# Patient Record
Sex: FEMALE | Race: WHITE | URBAN - METROPOLITAN AREA
[De-identification: names, ages, dates, MRNs, and addresses within clinical notes are randomized per-mention and may not be internally consistent; named-entity substitution may affect disease eponyms.]

---

## 2017-08-27 ENCOUNTER — EMERGENCY (EMERGENCY)
Facility: HOSPITAL | Age: 82
LOS: 0 days | Discharge: HOME | End: 2017-08-28
Attending: EMERGENCY MEDICINE
Payer: OTHER MISCELLANEOUS

## 2017-08-27 VITALS
TEMPERATURE: 98 F | DIASTOLIC BLOOD PRESSURE: 64 MMHG | SYSTOLIC BLOOD PRESSURE: 120 MMHG | WEIGHT: 89.95 LBS | HEART RATE: 82 BPM | RESPIRATION RATE: 18 BRPM | HEIGHT: 62 IN

## 2017-08-27 DIAGNOSIS — I21.3 ST ELEVATION (STEMI) MYOCARDIAL INFARCTION OF UNSPECIFIED SITE: ICD-10-CM

## 2017-08-27 DIAGNOSIS — Z79.82 LONG TERM (CURRENT) USE OF ASPIRIN: ICD-10-CM
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
## 2017-08-27 DIAGNOSIS — E11.9 TYPE 2 DIABETES MELLITUS WITHOUT COMPLICATIONS: ICD-10-CM
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
## 2017-08-27 DIAGNOSIS — E78.00 PURE HYPERCHOLESTEROLEMIA, UNSPECIFIED: ICD-10-CM

## 2017-08-27 DIAGNOSIS — I25.2 OLD MYOCARDIAL INFARCTION: ICD-10-CM

## 2017-08-27 DIAGNOSIS — D64.9 ANEMIA, UNSPECIFIED: ICD-10-CM

## 2017-08-27 DIAGNOSIS — T14.8 OTHER INJURY OF UNSPECIFIED BODY REGION: ICD-10-CM

## 2017-08-27 DIAGNOSIS — M25.552 PAIN IN LEFT HIP: ICD-10-CM

## 2017-08-27 DIAGNOSIS — Z95.5 PRESENCE OF CORONARY ANGIOPLASTY IMPLANT AND GRAFT: ICD-10-CM

## 2017-08-27 DIAGNOSIS — Z91.81 HISTORY OF FALLING: ICD-10-CM

## 2017-08-27 DIAGNOSIS — Z86.73 PERSONAL HISTORY OF TRANSIENT ISCHEMIC ATTACK (TIA), AND CEREBRAL INFARCTION WITHOUT RESIDUAL DEFICITS: ICD-10-CM

## 2017-08-27 DIAGNOSIS — Z87.891 PERSONAL HISTORY OF NICOTINE DEPENDENCE: ICD-10-CM

## 2017-08-27 LAB
ALBUMIN SERPL ELPH-MCNC: 3 G/DL — LOW (ref 3.3–5)
ALP SERPL-CCNC: 76 U/L — SIGNIFICANT CHANGE UP (ref 40–120)
ALT FLD-CCNC: 9 U/L — LOW (ref 12–78)
ANION GAP SERPL CALC-SCNC: 8 MMOL/L — SIGNIFICANT CHANGE UP (ref 5–17)
APPEARANCE UR: ABNORMAL
APTT BLD: 33 SEC — SIGNIFICANT CHANGE UP (ref 27.5–37.4)
AST SERPL-CCNC: 16 U/L — SIGNIFICANT CHANGE UP (ref 15–37)
BILIRUB SERPL-MCNC: 0.2 MG/DL — SIGNIFICANT CHANGE UP (ref 0.2–1.2)
BILIRUB UR-MCNC: NEGATIVE — SIGNIFICANT CHANGE UP
BUN SERPL-MCNC: 20 MG/DL — SIGNIFICANT CHANGE UP (ref 7–23)
CALCIUM SERPL-MCNC: 8.5 MG/DL — SIGNIFICANT CHANGE UP (ref 8.5–10.1)
CHLORIDE SERPL-SCNC: 107 MMOL/L — SIGNIFICANT CHANGE UP (ref 96–108)
CK MB CFR SERPL CALC: 1.1 NG/ML — SIGNIFICANT CHANGE UP (ref 0.5–3.6)
CO2 SERPL-SCNC: 27 MMOL/L — SIGNIFICANT CHANGE UP (ref 22–31)
COLOR SPEC: YELLOW — SIGNIFICANT CHANGE UP
CREAT SERPL-MCNC: 0.98 MG/DL — SIGNIFICANT CHANGE UP (ref 0.5–1.3)
DIFF PNL FLD: ABNORMAL
EOSINOPHIL NFR BLD AUTO: 1 % — SIGNIFICANT CHANGE UP (ref 0–6)
GLUCOSE SERPL-MCNC: 128 MG/DL — HIGH (ref 70–99)
GLUCOSE UR QL: NEGATIVE MG/DL — SIGNIFICANT CHANGE UP
HCT VFR BLD CALC: 28.3 % — LOW (ref 34.5–45)
HGB BLD-MCNC: 8.5 G/DL — LOW (ref 11.5–15.5)
INR BLD: 1.05 RATIO — SIGNIFICANT CHANGE UP (ref 0.88–1.16)
KETONES UR-MCNC: NEGATIVE — SIGNIFICANT CHANGE UP
LEUKOCYTE ESTERASE UR-ACNC: ABNORMAL
LYMPHOCYTES # BLD AUTO: 31 % — SIGNIFICANT CHANGE UP (ref 13–44)
MCHC RBC-ENTMCNC: 24.1 PG — LOW (ref 27–34)
MCHC RBC-ENTMCNC: 30 GM/DL — LOW (ref 32–36)
MCV RBC AUTO: 80.3 FL — SIGNIFICANT CHANGE UP (ref 80–100)
MONOCYTES NFR BLD AUTO: 3 % — SIGNIFICANT CHANGE UP (ref 2–14)
NEUTROPHILS NFR BLD AUTO: 65 % — SIGNIFICANT CHANGE UP (ref 43–77)
NITRITE UR-MCNC: POSITIVE
PH UR: 5 — SIGNIFICANT CHANGE UP (ref 5–8)
PLATELET # BLD AUTO: 182 K/UL — SIGNIFICANT CHANGE UP (ref 150–400)
POTASSIUM SERPL-MCNC: 3.8 MMOL/L — SIGNIFICANT CHANGE UP (ref 3.5–5.3)
POTASSIUM SERPL-SCNC: 3.8 MMOL/L — SIGNIFICANT CHANGE UP (ref 3.5–5.3)
PROT SERPL-MCNC: 7.3 GM/DL — SIGNIFICANT CHANGE UP (ref 6–8.3)
PROT UR-MCNC: 15 MG/DL
PROTHROM AB SERPL-ACNC: 11.5 SEC — SIGNIFICANT CHANGE UP (ref 9.8–12.7)
RBC # BLD: 3.53 M/UL — LOW (ref 3.8–5.2)
RBC # FLD: 15.6 % — HIGH (ref 11–15)
SODIUM SERPL-SCNC: 142 MMOL/L — SIGNIFICANT CHANGE UP (ref 135–145)
SP GR SPEC: 1.01 — SIGNIFICANT CHANGE UP (ref 1.01–1.02)
TROPONIN I SERPL-MCNC: 0.02 NG/ML — SIGNIFICANT CHANGE UP (ref 0.01–0.04)
UROBILINOGEN FLD QL: NEGATIVE MG/DL — SIGNIFICANT CHANGE UP
WBC # BLD: 6 K/UL — SIGNIFICANT CHANGE UP (ref 3.8–10.5)
WBC # FLD AUTO: 6 K/UL — SIGNIFICANT CHANGE UP (ref 3.8–10.5)

## 2017-08-27 PROCEDURE — 76377 3D RENDER W/INTRP POSTPROCES: CPT | Mod: 26

## 2017-08-27 PROCEDURE — 99285 EMERGENCY DEPT VISIT HI MDM: CPT

## 2017-08-27 PROCEDURE — 70450 CT HEAD/BRAIN W/O DYE: CPT | Mod: 26

## 2017-08-27 PROCEDURE — 72125 CT NECK SPINE W/O DYE: CPT | Mod: 26

## 2017-08-27 PROCEDURE — 73502 X-RAY EXAM HIP UNI 2-3 VIEWS: CPT | Mod: 26,LT

## 2017-08-27 PROCEDURE — 71010: CPT | Mod: 26

## 2017-08-27 NOTE — ED PROVIDER NOTE - PROGRESS NOTE DETAILS
Dr duran will follow up labs, ct, and xray Pt is able to fully extend legs to 90 degrees. Is bed bound at baseline due to frequent falls. UTI found on ua. Treated with ceftriaxone IV. [facility], they recommended patient ok for discharge home and will send a provider tomorrow to check on her. Family aware and in agreement with plan.

## 2017-08-27 NOTE — ED PROVIDER NOTE - NORMAL STATEMENT, MLM
Airway patent, nasal mucosa clear, mouth with normal mucosa. Throat has no vesicles, no oropharyngeal exudates and uvula is midline. Clear tympanic membranes b/l   No sign of head trauma

## 2017-08-28 VITALS
RESPIRATION RATE: 18 BRPM | OXYGEN SATURATION: 98 % | HEART RATE: 78 BPM | DIASTOLIC BLOOD PRESSURE: 84 MMHG | SYSTOLIC BLOOD PRESSURE: 179 MMHG

## 2017-08-28 RX ORDER — CEPHALEXIN 500 MG
1 CAPSULE ORAL
Qty: 21 | Refills: 0 | OUTPATIENT
Start: 2017-08-28 | End: 2017-09-04

## 2017-08-28 RX ORDER — CEFTRIAXONE 500 MG/1
1 INJECTION, POWDER, FOR SOLUTION INTRAMUSCULAR; INTRAVENOUS ONCE
Qty: 0 | Refills: 0 | Status: COMPLETED | OUTPATIENT
Start: 2017-08-28 | End: 2017-08-28

## 2017-08-28 RX ADMIN — CEFTRIAXONE 100 GRAM(S): 500 INJECTION, POWDER, FOR SOLUTION INTRAMUSCULAR; INTRAVENOUS at 00:58

## 2017-08-28 NOTE — ED ADULT NURSE NOTE - PMH
Anemia, unspecified type    Blockage of coronary artery of heart    Diabetes    Dizziness    Falls  h/o  High cholesterol    HTN (hypertension)    MI (myocardial infarction)    Stented coronary artery  8 stents  Syncope, vasovagal    TIA (transient ischemic attack)    Tobacco abuse  quit years ago  Vasovagal syncope

## 2017-11-17 ENCOUNTER — APPOINTMENT (OUTPATIENT)
Dept: HOME HEALTH SERVICES | Facility: HOME HEALTH | Age: 82
End: 2017-11-17
Payer: MEDICARE

## 2017-11-17 VITALS
HEART RATE: 68 BPM | SYSTOLIC BLOOD PRESSURE: 122 MMHG | OXYGEN SATURATION: 94 % | TEMPERATURE: 99.4 F | RESPIRATION RATE: 16 BRPM | DIASTOLIC BLOOD PRESSURE: 64 MMHG

## 2017-11-17 VITALS — RESPIRATION RATE: 14 BRPM | OXYGEN SATURATION: 95 % | HEART RATE: 70 BPM

## 2017-11-17 VITALS — WEIGHT: 85 LBS

## 2017-11-17 DIAGNOSIS — Z83.49 FAMILY HISTORY OF OTHER ENDOCRINE, NUTRITIONAL AND METABOLIC DISEASES: ICD-10-CM

## 2017-11-17 DIAGNOSIS — Z74.01 BED CONFINEMENT STATUS: ICD-10-CM

## 2017-11-17 DIAGNOSIS — Z82.5 FAMILY HISTORY OF ASTHMA AND OTHER CHRONIC LOWER RESPIRATORY DISEASES: ICD-10-CM

## 2017-11-17 DIAGNOSIS — R13.10 DYSPHAGIA, UNSPECIFIED: ICD-10-CM

## 2017-11-17 DIAGNOSIS — Z86.73 PERSONAL HISTORY OF TRANSIENT ISCHEMIC ATTACK (TIA), AND CEREBRAL INFARCTION W/OUT RESIDUAL DEFICITS: ICD-10-CM

## 2017-11-17 DIAGNOSIS — Z63.4 DISAPPEARANCE AND DEATH OF FAMILY MEMBER: ICD-10-CM

## 2017-11-17 DIAGNOSIS — R63.4 ABNORMAL WEIGHT LOSS: ICD-10-CM

## 2017-11-17 PROCEDURE — G0506: CPT

## 2017-11-17 PROCEDURE — 99345 HOME/RES VST NEW HIGH MDM 75: CPT | Mod: 25

## 2017-11-17 PROCEDURE — G0438: CPT

## 2017-11-17 SDOH — SOCIAL STABILITY - SOCIAL INSECURITY: DISSAPEARANCE AND DEATH OF FAMILY MEMBER: Z63.4

## 2017-11-21 ENCOUNTER — LABORATORY RESULT (OUTPATIENT)
Age: 82
End: 2017-11-21

## 2017-11-24 ENCOUNTER — APPOINTMENT (OUTPATIENT)
Dept: HOME HEALTH SERVICES | Facility: HOME HEALTH | Age: 82
End: 2017-11-24

## 2017-11-27 LAB
BASOPHILS # BLD AUTO: 0.02 K/UL
BASOPHILS NFR BLD AUTO: 0.3 %
EOSINOPHIL # BLD AUTO: 0.08 K/UL
EOSINOPHIL NFR BLD AUTO: 1.3
HBA1C MFR BLD HPLC: 6.2 %
HCT VFR BLD CALC: 29.3 %
HGB BLD-MCNC: 9.1 G/DL
IMM GRANULOCYTES NFR BLD AUTO: 0.3 %
LYMPHOCYTES # BLD AUTO: 1.84 K/UL
LYMPHOCYTES NFR BLD AUTO: 30.6 %
MAN DIFF?: NORMAL
MCHC RBC-ENTMCNC: 24.5 PG
MCHC RBC-ENTMCNC: 31.1 GM/DL
MCV RBC AUTO: 79 FL
MONOCYTES # BLD AUTO: 0.42 K/UL
MONOCYTES NFR BLD AUTO: 7 %
NEUTROPHILS # BLD AUTO: 3.64 K/UL
NEUTROPHILS NFR BLD AUTO: 60.5 %
PLATELET # BLD AUTO: 196 K/UL
PREALB SERPL NEPH-MCNC: 16 MG/DL
RBC # BLD: 3.71 M/UL
RBC # FLD: 15.6 %
TSH SERPL-ACNC: 2.07 UIU/ML
WBC # FLD AUTO: 6.02 K/UL

## 2017-11-29 ENCOUNTER — CHART COPY (OUTPATIENT)
Age: 82
End: 2017-11-29

## 2017-12-01 ENCOUNTER — APPOINTMENT (OUTPATIENT)
Dept: HOME HEALTH SERVICES | Facility: HOME HEALTH | Age: 82
End: 2017-12-01

## 2017-12-03 ENCOUNTER — CLINICAL ADVICE (OUTPATIENT)
Age: 82
End: 2017-12-03

## 2017-12-04 ENCOUNTER — MEDICATION RENEWAL (OUTPATIENT)
Age: 82
End: 2017-12-04

## 2017-12-08 ENCOUNTER — CLINICAL ADVICE (OUTPATIENT)
Age: 82
End: 2017-12-08

## 2017-12-12 ENCOUNTER — APPOINTMENT (OUTPATIENT)
Dept: HOME HEALTH SERVICES | Facility: HOME HEALTH | Age: 82
End: 2017-12-12

## 2017-12-12 VITALS
RESPIRATION RATE: 16 BRPM | TEMPERATURE: 97.6 F | HEART RATE: 74 BPM | DIASTOLIC BLOOD PRESSURE: 62 MMHG | OXYGEN SATURATION: 92 % | SYSTOLIC BLOOD PRESSURE: 116 MMHG

## 2017-12-15 PROBLEM — Z74.01 CONFINED TO BED: Status: ACTIVE | Noted: 2017-11-17

## 2017-12-19 ENCOUNTER — MEDICATION RENEWAL (OUTPATIENT)
Age: 82
End: 2017-12-19

## 2017-12-22 ENCOUNTER — APPOINTMENT (OUTPATIENT)
Dept: HOME HEALTH SERVICES | Facility: HOME HEALTH | Age: 82
End: 2017-12-22
Payer: MEDICARE

## 2017-12-22 VITALS
TEMPERATURE: 98 F | SYSTOLIC BLOOD PRESSURE: 132 MMHG | RESPIRATION RATE: 12 BRPM | HEART RATE: 71 BPM | OXYGEN SATURATION: 98 % | DIASTOLIC BLOOD PRESSURE: 70 MMHG

## 2017-12-22 DIAGNOSIS — S22.32XA FRACTURE OF ONE RIB, LEFT SIDE, INITIAL ENCOUNTER FOR CLOSED FRACTURE: ICD-10-CM

## 2017-12-22 DIAGNOSIS — Z65.8 OTHER SPECIFIED PROBLEMS RELATED TO PSYCHOSOCIAL CIRCUMSTANCES: ICD-10-CM

## 2017-12-22 DIAGNOSIS — I10 ESSENTIAL (PRIMARY) HYPERTENSION: ICD-10-CM

## 2017-12-22 DIAGNOSIS — T17.908A UNSPECIFIED FOREIGN BODY IN RESPIRATORY TRACT, PART UNSPECIFIED CAUSING OTHER INJURY, INITIAL ENCOUNTER: ICD-10-CM

## 2017-12-22 PROCEDURE — 99349 HOME/RES VST EST MOD MDM 40: CPT

## 2017-12-26 ENCOUNTER — OTHER (OUTPATIENT)
Age: 82
End: 2017-12-26

## 2018-01-16 ENCOUNTER — MEDICATION RENEWAL (OUTPATIENT)
Age: 83
End: 2018-01-16

## 2018-01-22 ENCOUNTER — CLINICAL ADVICE (OUTPATIENT)
Age: 83
End: 2018-01-22

## 2018-01-30 ENCOUNTER — CLINICAL ADVICE (OUTPATIENT)
Age: 83
End: 2018-01-30

## 2018-01-30 DIAGNOSIS — R41.82 ALTERED MENTAL STATUS, UNSPECIFIED: ICD-10-CM

## 2018-02-01 ENCOUNTER — APPOINTMENT (OUTPATIENT)
Dept: HOME HEALTH SERVICES | Facility: HOME HEALTH | Age: 83
End: 2018-02-01

## 2018-02-01 VITALS
TEMPERATURE: 97.8 F | SYSTOLIC BLOOD PRESSURE: 126 MMHG | OXYGEN SATURATION: 98 % | HEART RATE: 72 BPM | DIASTOLIC BLOOD PRESSURE: 68 MMHG | RESPIRATION RATE: 16 BRPM

## 2018-02-06 ENCOUNTER — CLINICAL ADVICE (OUTPATIENT)
Age: 83
End: 2018-02-06

## 2018-02-06 LAB
ALBUMIN SERPL ELPH-MCNC: 3.4 G/DL
ALP BLD-CCNC: 64 U/L
ALT SERPL-CCNC: 6 U/L
ANION GAP SERPL CALC-SCNC: 13 MMOL/L
AST SERPL-CCNC: 9 U/L
BASOPHILS # BLD AUTO: 0.03 K/UL
BASOPHILS NFR BLD AUTO: 0.6 %
BILIRUB SERPL-MCNC: 0.2 MG/DL
BUN SERPL-MCNC: 26 MG/DL
CALCIUM SERPL-MCNC: 8.9 MG/DL
CHLORIDE SERPL-SCNC: 102 MMOL/L
CO2 SERPL-SCNC: 25 MMOL/L
CREAT SERPL-MCNC: 1.12 MG/DL
EOSINOPHIL # BLD AUTO: 0.05 K/UL
EOSINOPHIL NFR BLD AUTO: 1 %
HCT VFR BLD CALC: 25.8 %
HGB BLD-MCNC: 8.1 G/DL
IMM GRANULOCYTES NFR BLD AUTO: 0.4 %
LYMPHOCYTES # BLD AUTO: 1.18 K/UL
LYMPHOCYTES NFR BLD AUTO: 23.7 %
MAN DIFF?: NORMAL
MCHC RBC-ENTMCNC: 24.7 PG
MCHC RBC-ENTMCNC: 31.4 GM/DL
MCV RBC AUTO: 78.7 FL
MONOCYTES # BLD AUTO: 0.31 K/UL
MONOCYTES NFR BLD AUTO: 6.2 %
NEUTROPHILS # BLD AUTO: 3.39 K/UL
NEUTROPHILS NFR BLD AUTO: 68.1 %
PLATELET # BLD AUTO: 167 K/UL
POTASSIUM SERPL-SCNC: 3.8 MMOL/L
PROT SERPL-MCNC: 6.5 G/DL
RBC # BLD: 3.28 M/UL
RBC # FLD: 16.3 %
SODIUM SERPL-SCNC: 140 MMOL/L
WBC # FLD AUTO: 4.98 K/UL

## 2018-02-13 ENCOUNTER — CLINICAL ADVICE (OUTPATIENT)
Age: 83
End: 2018-02-13

## 2018-02-14 ENCOUNTER — APPOINTMENT (OUTPATIENT)
Dept: HOME HEALTH SERVICES | Facility: HOME HEALTH | Age: 83
End: 2018-02-14

## 2018-02-14 VITALS
SYSTOLIC BLOOD PRESSURE: 146 MMHG | DIASTOLIC BLOOD PRESSURE: 82 MMHG | OXYGEN SATURATION: 98 % | TEMPERATURE: 98.4 F | HEART RATE: 80 BPM | RESPIRATION RATE: 16 BRPM

## 2018-02-14 RX ORDER — NITROGLYCERIN 0.4 MG/1
0.4 TABLET SUBLINGUAL
Qty: 1 | Refills: 1 | Status: ACTIVE | COMMUNITY
Start: 2017-11-17

## 2018-02-16 ENCOUNTER — APPOINTMENT (OUTPATIENT)
Dept: HOME HEALTH SERVICES | Facility: HOME HEALTH | Age: 83
End: 2018-02-16
Payer: MEDICARE

## 2018-02-16 VITALS
OXYGEN SATURATION: 99 % | TEMPERATURE: 98.3 F | DIASTOLIC BLOOD PRESSURE: 60 MMHG | SYSTOLIC BLOOD PRESSURE: 178 MMHG | HEART RATE: 71 BPM | RESPIRATION RATE: 14 BRPM

## 2018-02-16 DIAGNOSIS — K56.41 FECAL IMPACTION: ICD-10-CM

## 2018-02-16 DIAGNOSIS — E11.49 TYPE 2 DIABETES MELLITUS WITH OTHER DIABETIC NEUROLOGICAL COMPLICATION: ICD-10-CM

## 2018-02-16 PROCEDURE — 99350 HOME/RES VST EST HIGH MDM 60: CPT

## 2018-02-28 ENCOUNTER — MEDICATION RENEWAL (OUTPATIENT)
Age: 83
End: 2018-02-28

## 2018-03-06 ENCOUNTER — LABORATORY RESULT (OUTPATIENT)
Age: 83
End: 2018-03-06

## 2018-03-07 LAB
ALBUMIN SERPL ELPH-MCNC: 3.7 G/DL
ALP BLD-CCNC: 67 U/L
ALT SERPL-CCNC: 9 U/L
ANION GAP SERPL CALC-SCNC: 11 MMOL/L
AST SERPL-CCNC: 11 U/L
BASOPHILS # BLD AUTO: 0.02 K/UL
BASOPHILS NFR BLD AUTO: 0.4 %
BILIRUB SERPL-MCNC: 0.2 MG/DL
BUN SERPL-MCNC: 22 MG/DL
CALCIUM SERPL-MCNC: 8.9 MG/DL
CHLORIDE SERPL-SCNC: 103 MMOL/L
CO2 SERPL-SCNC: 25 MMOL/L
CREAT SERPL-MCNC: 1.12 MG/DL
EOSINOPHIL # BLD AUTO: 0.05 K/UL
EOSINOPHIL NFR BLD AUTO: 1.1 %
FERRITIN SERPL-MCNC: 16 NG/ML
HCT VFR BLD CALC: 27.2 %
HGB BLD-MCNC: 8.6 G/DL
IMM GRANULOCYTES NFR BLD AUTO: 0.4 %
LYMPHOCYTES # BLD AUTO: 1.47 K/UL
LYMPHOCYTES NFR BLD AUTO: 31.9 %
MAN DIFF?: NORMAL
MCHC RBC-ENTMCNC: 25.7 PG
MCHC RBC-ENTMCNC: 31.6 GM/DL
MCV RBC AUTO: 81.2 FL
MONOCYTES # BLD AUTO: 0.27 K/UL
MONOCYTES NFR BLD AUTO: 5.9 %
NEUTROPHILS # BLD AUTO: 2.78 K/UL
NEUTROPHILS NFR BLD AUTO: 60.3 %
PLATELET # BLD AUTO: 167 K/UL
POTASSIUM SERPL-SCNC: 4.8 MMOL/L
PROT SERPL-MCNC: 6.8 G/DL
RBC # BLD: 3.35 M/UL
RBC # FLD: 16.8 %
SODIUM SERPL-SCNC: 139 MMOL/L
WBC # FLD AUTO: 4.61 K/UL

## 2018-03-08 ENCOUNTER — CLINICAL ADVICE (OUTPATIENT)
Age: 83
End: 2018-03-08

## 2018-03-14 ENCOUNTER — CLINICAL ADVICE (OUTPATIENT)
Age: 83
End: 2018-03-14

## 2018-03-16 ENCOUNTER — MEDICATION RENEWAL (OUTPATIENT)
Age: 83
End: 2018-03-16

## 2018-03-16 DIAGNOSIS — Z51.5 ENCOUNTER FOR PALLIATIVE CARE: ICD-10-CM

## 2018-04-13 ENCOUNTER — CLINICAL ADVICE (OUTPATIENT)
Age: 83
End: 2018-04-13

## 2018-04-13 RX ORDER — SULFAMETHOXAZOLE AND TRIMETHOPRIM 800; 160 MG/1; MG/1
800-160 TABLET ORAL TWICE DAILY
Qty: 10 | Refills: 0 | Status: DISCONTINUED | COMMUNITY
Start: 2018-02-13 | End: 2018-04-13

## 2018-04-16 ENCOUNTER — MEDICATION RENEWAL (OUTPATIENT)
Age: 83
End: 2018-04-16

## 2018-04-30 ENCOUNTER — CLINICAL ADVICE (OUTPATIENT)
Age: 83
End: 2018-04-30

## 2018-04-30 DIAGNOSIS — R19.7 DIARRHEA, UNSPECIFIED: ICD-10-CM

## 2018-05-04 ENCOUNTER — APPOINTMENT (OUTPATIENT)
Dept: HOME HEALTH SERVICES | Facility: HOME HEALTH | Age: 83
End: 2018-05-04
Payer: MEDICARE

## 2018-05-04 VITALS
RESPIRATION RATE: 14 BRPM | DIASTOLIC BLOOD PRESSURE: 55 MMHG | TEMPERATURE: 98.3 F | OXYGEN SATURATION: 97 % | HEART RATE: 72 BPM | SYSTOLIC BLOOD PRESSURE: 130 MMHG

## 2018-05-04 DIAGNOSIS — D50.9 IRON DEFICIENCY ANEMIA, UNSPECIFIED: ICD-10-CM

## 2018-05-04 DIAGNOSIS — I25.118 ATHEROSCLEROTIC HEART DISEASE OF NATIVE CORONARY ARTERY WITH OTHER FORMS OF ANGINA PECTORIS: ICD-10-CM

## 2018-05-04 DIAGNOSIS — K59.09 OTHER CONSTIPATION: ICD-10-CM

## 2018-05-04 PROCEDURE — 99349 HOME/RES VST EST MOD MDM 40: CPT | Mod: GV

## 2018-05-04 RX ORDER — GLUCOSAMINE HCL/CHONDROITIN SU 500-400 MG
3 CAPSULE ORAL
Qty: 90 | Refills: 3 | Status: DISCONTINUED | COMMUNITY
Start: 2017-11-17 | End: 2018-05-04

## 2018-05-04 RX ORDER — SENNOSIDES 8.6 MG TABLETS 8.6 MG/1
8.6 TABLET ORAL
Qty: 30 | Refills: 5 | Status: ACTIVE | COMMUNITY
Start: 2017-11-17

## 2018-05-04 RX ORDER — POLYETHYLENE GLYCOL 3350 17 G/17G
17 POWDER, FOR SOLUTION ORAL DAILY
Qty: 1 | Refills: 0 | Status: ACTIVE | COMMUNITY
Start: 2018-02-06

## 2018-05-08 ENCOUNTER — CLINICAL ADVICE (OUTPATIENT)
Age: 83
End: 2018-05-08

## 2018-05-08 RX ORDER — TRAZODONE HYDROCHLORIDE 50 MG/1
50 TABLET ORAL
Qty: 90 | Refills: 3 | Status: DISCONTINUED | COMMUNITY
Start: 2017-12-22 | End: 2018-05-08

## 2018-05-09 ENCOUNTER — MEDICATION RENEWAL (OUTPATIENT)
Age: 83
End: 2018-05-09

## 2018-05-09 RX ORDER — MORPHINE SULFATE 100 MG/5ML
100 SOLUTION ORAL
Qty: 30 | Refills: 0 | Status: ACTIVE | COMMUNITY
Start: 2018-05-09 | End: 1900-01-01

## 2018-05-14 DIAGNOSIS — N39.0 URINARY TRACT INFECTION, SITE NOT SPECIFIED: ICD-10-CM

## 2018-05-28 NOTE — ED PROVIDER NOTE - PROGRESS NOTE
Subjective:      Patient ID: Marina Trimble is a 81 y.o. female.    Chief Complaint: Diabetes Mellitus (pcp ehrensing 4/4/18); Diabetic Foot Exam; and Nail Care    Marina is a 81 y.o. female who presents to the clinic for evaluation and treatment of high risk feet. 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 The patient's chief complaint is long, thick toenails. This patient has documented high risk feet requiring routine maintenance secondary to diabetes mellitis and those secondary complications of diabetes, as mentioned. She has the one type of anemia; pernious 281.0 that still qualifies for nail care.    PCP: Jose Jimenez MD    Date Last Seen by PCP:   Chief Complaint   Patient presents with    Diabetes Mellitus     pcp ehrensing 4/4/18    Diabetic Foot Exam    Nail Care       Current shoe gear:  SAS shoes    Hemoglobin A1C   Date Value Ref Range Status   11/09/2017 6.9 (H) 4.0 - 5.6 % Final     Comment:     According to ADA guidelines, hemoglobin A1c <7.0% represents  optimal control in non-pregnant diabetic patients. Different  metrics may apply to  specific patient populations.   Standards of Medical Care in Diabetes-2016.  For the purpose of screening for the presence of diabetes:  <5.7%     Consistent with the absence of diabetes  5.7-6.4%  Consistent with increasing risk for diabetes   (prediabetes)  >or=6.5%  Consistent with diabetes  Currently, no consensus exists for use of hemoglobin A1c  for diagnosis of diabetes for children.  This Hemoglobin A1c assay has significant interference with fetal   hemoglobin   (HbF). The results are invalid for patients with abnormal amounts of   HbF,   including those with known Hereditary Persistence   of Fetal Hemoglobin. Heterozygous hemoglobin variants (HbAS, HbAC,   HbAD, HbAE, HbA2) do not significantly interfere with this assay;   however, presence of multiple variants in a sample may impact the %   interference.     07/09/2017 8.1 (H) 4.0 - 5.6 % Final     Comment:     According to ADA guidelines, hemoglobin A1c <7.0% represents  optimal control in non-pregnant diabetic patients. Different  metrics may apply to specific patient populations.   Standards of Medical Care in Diabetes-2016.  For the purpose of screening for the presence of diabetes:  <5.7%     Consistent with the absence of diabetes  5.7-6.4%  Consistent with increasing risk for diabetes   (prediabetes)  >or=6.5%  Consistent with diabetes  Currently, no consensus exists for use of hemoglobin A1c  for diagnosis of diabetes for children.  This Hemoglobin A1c assay has significant interference with fetal   hemoglobin   (HbF). The results are invalid for patients with abnormal amounts of   HbF,   including those with known Hereditary Persistence   of Fetal Hemoglobin. Heterozygous hemoglobin variants (HbAS, HbAC,   HbAD, HbAE, HbA2) do not significantly interfere with this assay;   however, presence of multiple variants in a sample may impact the %   interference.     12/23/2016 9.2 (H) 4.5 - 6.2 % Final     Comment:     According to ADA guidelines,  "hemoglobin A1C <7.0% represents  optimal control in non-pregnant diabetic patients.  Different  metrics may apply to specific populations.   Standards of Medical Care in Diabetes - 2016.  For the purpose of screening for the presence of diabetes:  <5.7%     Consistent with the absence of diabetes  5.7-6.4%  Consistent with increasing risk for diabetes   (prediabetes)  >or=6.5%  Consistent with diabetes  Currently no consensus exists for use of hemoglobin A1C  for diagnosis of diabetes for children.       Current Outpatient Prescriptions on File Prior to Visit   Medication Sig Dispense Refill    ACCU-CHEK FASTCLIX Misc USE TO CHECK BLOOD GLUCOSE THREE TIMES DAILY 1000 each 11    ACCU-CHEK SMARTVIEW TEST STRIP Strp USE TO CHECK BLOOD GLUCOSE 3 TIMES DAILY 300 strip 11    acetaminophen (TYLENOL) 500 MG tablet Take 1 tablet (500 mg total) by mouth every 4 (four) hours as needed for Pain.  0    acetaminophen-codeine 300-60mg (TYLENOL #4) 300-60 mg Tab Take 1 tablet by mouth every 8 (eight) hours as needed (back pain). 60 tablet 3    amitriptyline (ELAVIL) 10 MG tablet Take 10 mg by mouth once daily.       aspirin (ECOTRIN) 81 MG EC tablet Take 81 mg by mouth once daily.      atorvastatin (LIPITOR) 40 MG tablet TAKE 1 TABLET EVERY DAY 90 tablet 3    BD ULTRA-FINE BEVERLY PEN NEEDLE 32 gauge x 5/32" Ndle USE TWICE DAILY 500 each 12    blood-glucose meter Misc Use ad directed (Accu Check [place] smartview meter) 1 each 4    clopidogrel (PLAVIX) 75 mg tablet TAKE 1 TABLET EVERY DAY 90 tablet 3    cyanocobalamin 1,000 mcg/mL injection INJECT 1 ML UNDER THE SKIN EVERY 2 WEEKS 6 mL 0    cycloSPORINE (RESTASIS) 0.05 % ophthalmic emulsion Place 0.4 mLs (1 drop total) into both eyes 2 (two) times daily. 180 vial 12    insulin aspart (NOVOLOG FLEXPEN) 100 unit/mL InPn pen Inject 4/6/6 units into skin with correction scale 150-200 +1, 201-250 +2, 251-300 +3, 301-350 +4, >350 +5. 6 mL 4    LEVEMIR FLEXTOUCH U-100 INSULN 100 " "unit/mL (3 mL) InPn pen INJECT  35 UNITS SUBCUTANEOUSLY EVERY EVENING 30 mL 4    losartan (COZAAR) 100 MG Tab TAKE 1 TABLET ONE TIME DAILY 90 tablet 90    multivitamin (CHEWABLE MULTI VITAMIN) Chew Take 2 tablets by mouth once daily.       naproxen (NAPROSYN) 500 MG tablet Take 1 tablet (500 mg total) by mouth 2 (two) times daily with meals. 11 tablet 0    nitroGLYCERIN (NITROSTAT) 0.4 MG SL tablet Place 1 tablet (0.4 mg total) under the tongue every 5 (five) minutes as needed for Chest pain. 30 tablet 12    NOVOFINE 32 32 gauge x 1/4" Ndle USE WITH LEVEMIR PEN EVERY EVENING 500 each 12    omeprazole (PRILOSEC) 40 MG capsule TAKE 1 CAPSULE EVERY MORNING 90 capsule 90    prochlorperazine (COMPAZINE) 10 MG tablet Take 1 tablet (10 mg total) by mouth every 6 (six) hours as needed. 30 tablet 3    PROCTOZONE-HC 2.5 % rectal cream Place rectally 2 (two) times daily. 90 g 12    triamcinolone acetonide 0.1% (KENALOG) 0.1 % cream       TRUEPLUS LANCETS 28 gauge Misc USE  TO CHECK BLOOD SUGAR THREE TIMES DAILY 300 each 0    UNABLE TO FIND Places uses Wellpatch daily      vitamin D 1000 units Tab Take 1 tablet (1,000 Units total) by mouth once daily.      VOLTAREN 1 % Gel        No current facility-administered medications on file prior to visit.      Review of patient's allergies indicates:   Allergen Reactions    Baclofen Nausea And Vomiting and Other (See Comments)     Dizziness    Benazepril      Other reaction(s): Rash,Swelling    Carisoprodol      Other reaction(s): Dysphoria    Desloratadine      Other reaction(s): Nausea  Other reaction(s): Nausea    Hydrocodone      Other reaction(s): disorientation    Methocarbamol      Other reaction(s): Nausea    Omeprazole Nausea And Vomiting    Oxybutynin      Other reaction(s): Hallucinations    Oxycodone Nausea And Vomiting    Tramadol      Other reaction(s): dizziness    Benazepril-hydrochlorothiazide Rash     Other reaction(s): Swelling    Metformin " Diarrhea    Prednisone Rash     Past Surgical History:   Procedure Laterality Date    APPENDECTOMY      CATARACT EXTRACTION W/  INTRAOCULAR LENS IMPLANT Left 10/16/14    OS ()    CHOLECYSTECTOMY      COLECTOMY      COLONOSCOPY      EYE SURGERY Bilateral     cataracts    eyelid surgery      HYSTERECTOMY      PARTIAL GLOSSECTOMY  2010    SELECTIVE NECK DISSECTION  2010    tonsilectomy      TONSILLECTOMY       Family History   Problem Relation Age of Onset    Cataracts Mother     Macular degeneration Mother     Blindness Mother     No Known Problems Father     No Known Problems Son     No Known Problems Son     Coronary artery disease Unknown     COPD Unknown     No Known Problems Sister     No Known Problems Brother     No Known Problems Maternal Aunt     No Known Problems Maternal Uncle     No Known Problems Paternal Aunt     No Known Problems Paternal Uncle     No Known Problems Maternal Grandmother     No Known Problems Maternal Grandfather     No Known Problems Paternal Grandmother     No Known Problems Paternal Grandfather     Skin cancer Neg Hx     Melanoma Neg Hx     Amblyopia Neg Hx     Cancer Neg Hx     Diabetes Neg Hx     Glaucoma Neg Hx     Hypertension Neg Hx     Retinal detachment Neg Hx     Strabismus Neg Hx     Stroke Neg Hx     Thyroid disease Neg Hx     Colon cancer Neg Hx     Esophageal cancer Neg Hx      Social History     Social History    Marital status:      Spouse name: N/A    Number of children: N/A    Years of education: N/A     Occupational History    Not on file.     Social History Main Topics    Smoking status: Never Smoker    Smokeless tobacco: Never Used    Alcohol use No    Drug use: No    Sexual activity: No     Other Topics Concern    Are You Pregnant Or Think You May Be? No    Breast-Feeding No     Social History Narrative    She is . She [place] [facility] [place]     Review of Systems   Constitution:  "Negative for chills, fever and weakness.   Cardiovascular: Positive for leg swelling. Negative for claudication.   Respiratory: Positive for shortness of breath. Negative for cough.    Skin: Positive for dry skin and nail changes. Negative for itching and rash.   Musculoskeletal: Positive for arthritis, back pain, falls (december 2016, broke her hip), gout and joint pain. Negative for joint swelling and muscle weakness.   Gastrointestinal: Negative for diarrhea, nausea and vomiting.   Neurological: Positive for paresthesias. Negative for numbness and tremors.   Psychiatric/Behavioral: Negative for altered mental status and hallucinations.         Objective:       Vitals:    05/28/18 1055   BP: (!) 142/60   Weight: 58.1 kg (128 lb 1.4 oz)   Height: 4' 10" (1.473 m)   PainSc: 0-No pain       Physical Exam   Constitutional:   General: Pt. is well-developed, well-nourished, appears stated age, in no acute distress, alert and oriented x 3. No evidence of depression, anxiety, or agitation. Calm, cooperative, and communicative. Appropriate interactions and affect.       Cardiovascular:   Pulses:       Dorsalis pedis pulses are 1+ on the right side, and 1+ on the left side.        Posterior tibial pulses are 1+ on the right side, and 1+ on the left side.   Dorsalis pedis and posterior tibial pulses are diminished Bilaterally. Toes are cool to touch. Feet are warm proximally.There is decreased digital hair. Skin is atrophic   Musculoskeletal:        Right ankle: She exhibits swelling. She exhibits normal range of motion. No tenderness. Achilles tendon exhibits no pain and no defect.        Left ankle: She exhibits swelling. She exhibits normal range of motion. No tenderness. Achilles tendon exhibits no pain and no defect.        Right foot: There is normal range of motion and no tenderness.        Left foot: There is normal range of motion and no tenderness.   Decreased stride, station of gait.  apropulsive toe off.  " Increased angle and base of gait.    Patient has hammertoes of digits 2-5 bilateral partially reducible without symptom today.    Visible and palpable bunion without pain at dorsomedial 1st metatarsal head right and left.  Hallux abducted right and left partially reducible, tracks laterally without being track bound.  No ecchymosis, erythema, edema, or cardinal signs infection or signs of trauma same foot.    Fat pad atrophy to heels and met heads bilateral       Neurological: No sensory deficit.   [place] 5.07 monofilament is intact bilateral feet. Sharp/dull sensation is also intact Bilateral feet.       Skin: Skin is warm, dry and intact. No abrasion, no ecchymosis, no lesion and no rash noted. She is not diaphoretic. No cyanosis or erythema. No pallor. Nails show no clubbing.   Skin is atrophic, mild plantar rubor.  No pallor.      No pedal hair noted    Toenails 1-5 bilaterally are elongated by 2-3 mm, thickened by 2-3 mm, discolored/yellowed, dystrophic, brittle with subungual debris.     granuloma noted to lateral right hallux nail border;  Tender to palpation     Interdigital Spaces clean, dry and without evidence of break in skin integrity   Psychiatric: She has a normal mood and affect. Her speech is normal.   Nursing note and vitals reviewed.        Assessment:       Encounter Diagnoses   Name Primary?    Type II diabetes mellitus with neurological manifestations Yes    Pernicious anemia     Fat pad atrophy of foot     Nail dermatophytosis          Plan:       Marina was seen today for diabetes mellitus, diabetic foot exam and nail care.    Diagnoses and all orders for this visit:    Type II diabetes mellitus with neurological manifestations    Pernicious anemia    Fat pad atrophy of foot    Nail dermatophytosis      I counseled the patient on her conditions, their implications and medical management.    - Shoe inspection. Diabetic Foot Education. Patient reminded of the importance of good  nutrition and blood sugar control to help prevent podiatric complications of diabetes. Patient instructed on proper foot hygeine. We discussed wearing proper shoe gear, daily foot inspections, never walking without protective shoe gear, never putting sharp instruments to feet.     - With patient's permission, nails were aggressively reduced and debrided x 10 to their soft tissue attachment mechanically and with electric , removing all offending nail and debris. Patient relates relief following the procedure.  She will continue to monitor the areas daily, inspect her feet, wear protective shoe gear when ambulatory, moisturizer to maintain skin integrity and follow in this office in approximately 3-4 months, sooner p.r.n.                                       Improved.

## 2018-06-27 ENCOUNTER — APPOINTMENT (OUTPATIENT)
Dept: HOME HEALTH SERVICES | Facility: HOME HEALTH | Age: 83
End: 2018-06-27
Payer: MEDICARE

## 2018-06-27 VITALS
HEART RATE: 73 BPM | SYSTOLIC BLOOD PRESSURE: 145 MMHG | RESPIRATION RATE: 12 BRPM | DIASTOLIC BLOOD PRESSURE: 70 MMHG | TEMPERATURE: 98.4 F | OXYGEN SATURATION: 97 %

## 2018-06-27 DIAGNOSIS — I70.0 ATHEROSCLEROSIS OF AORTA: ICD-10-CM

## 2018-06-27 DIAGNOSIS — N18.3 TYPE 2 DIABETES MELLITUS WITH DIABETIC CHRONIC KIDNEY DISEASE: ICD-10-CM

## 2018-06-27 DIAGNOSIS — E11.22 TYPE 2 DIABETES MELLITUS WITH DIABETIC CHRONIC KIDNEY DISEASE: ICD-10-CM

## 2018-06-27 DIAGNOSIS — E78.5 HYPERLIPIDEMIA, UNSPECIFIED: ICD-10-CM

## 2018-06-27 DIAGNOSIS — N18.3 CHRONIC KIDNEY DISEASE, STAGE 3 (MODERATE): ICD-10-CM

## 2018-06-27 DIAGNOSIS — G47.01 INSOMNIA DUE TO MEDICAL CONDITION: ICD-10-CM

## 2018-06-27 DIAGNOSIS — E44.1 MILD PROTEIN-CALORIE MALNUTRITION: ICD-10-CM

## 2018-06-27 DIAGNOSIS — F03.90 UNSPECIFIED DEMENTIA W/OUT BEHAVIORAL DISTURBANCE: ICD-10-CM

## 2018-06-27 PROCEDURE — 99350 HOME/RES VST EST HIGH MDM 60: CPT | Mod: GV

## 2018-06-27 RX ORDER — DIPHENHYDRAMINE HCL, IBUPROFEN 25; 200 MG/1; MG/1
200-25 CAPSULE, LIQUID FILLED ORAL
Qty: 14 | Refills: 0 | Status: DISCONTINUED | COMMUNITY
Start: 2018-04-13 | End: 2018-06-27

## 2018-06-27 RX ORDER — QUETIAPINE FUMARATE 25 MG/1
25 TABLET ORAL
Qty: 30 | Refills: 3 | Status: DISCONTINUED | COMMUNITY
Start: 2018-05-08 | End: 2018-06-27

## 2018-06-27 RX ORDER — QUETIAPINE FUMARATE 50 MG/1
50 TABLET ORAL
Qty: 30 | Refills: 3 | Status: ACTIVE | COMMUNITY
Start: 2018-06-27 | End: 1900-01-01

## 2018-06-27 RX ORDER — PANTOPRAZOLE 40 MG/1
40 TABLET, DELAYED RELEASE ORAL DAILY
Qty: 90 | Refills: 1 | Status: ACTIVE | COMMUNITY
Start: 2017-11-17

## 2018-06-27 RX ORDER — METOPROLOL SUCCINATE 25 MG/1
25 TABLET, EXTENDED RELEASE ORAL DAILY
Qty: 90 | Refills: 3 | Status: ACTIVE | COMMUNITY
Start: 2017-11-17

## 2018-06-27 RX ORDER — CEFDINIR 300 MG/1
300 CAPSULE ORAL
Qty: 20 | Refills: 0 | Status: DISCONTINUED | COMMUNITY
Start: 2018-05-14 | End: 2018-06-27

## 2018-06-27 RX ORDER — DILTIAZEM HYDROCHLORIDE 30 MG/1
30 TABLET ORAL
Qty: 120 | Refills: 3 | Status: ACTIVE | COMMUNITY
Start: 2017-11-17

## 2018-06-27 RX ORDER — LOPERAMIDE HYDROCHLORIDE 2 MG/1
2 CAPSULE ORAL TWICE DAILY
Qty: 10 | Refills: 0 | Status: DISCONTINUED | COMMUNITY
Start: 2018-04-30 | End: 2018-06-27

## 2018-06-27 RX ORDER — BLOOD-GLUCOSE METER, DRUM-TYPE
KIT MISCELLANEOUS
Qty: 1 | Refills: 0 | Status: DISCONTINUED | COMMUNITY
Start: 2018-02-02 | End: 2018-06-27

## 2018-06-27 RX ORDER — L.ACIDOPH/L.BULG/B.BIF/S.THERM 1B-250 MG
TABLET ORAL
Qty: 60 | Refills: 5 | Status: DISCONTINUED | COMMUNITY
Start: 2018-03-14 | End: 2018-06-27

## 2018-06-27 RX ORDER — CHLORHEXIDINE GLUCONATE 4 %
325 (65 FE) LIQUID (ML) TOPICAL DAILY
Qty: 1 | Refills: 0 | Status: ACTIVE | COMMUNITY
Start: 2018-02-06

## 2018-11-29 NOTE — ED PROVIDER NOTE - OBJECTIVE STATEMENT
Initiate Treatment: Fluocinonide cream twice daily x one month. Then taper to once daily for one month to affected lower legs only. Continue Regimen: Recommend to continue with Metronidazole, and minocycline. Detail Level: Zone this ia 89 yo bib daughters  pmhx of heart disease, bed bound, dementia, lives at home, hospice patient. Pt is s/p fall c/o of left hip pain since 8 am. Pt tries frequently to get out of bed and managed to get out of bed and fall in the bathroom. She denies nausea, vomiting, fever, sob, chest pain, loc, seizure, neck pain, headache, head trauma, blood thinner. Continue Regimen: compression hose, elevate legs Detail Level: Simple

## 2020-12-16 PROBLEM — N39.0 ACUTE UTI: Status: RESOLVED | Noted: 2018-02-13 | Resolved: 2020-12-16

## 2021-10-06 PROBLEM — I10 ESSENTIAL HYPERTENSION: Status: ACTIVE | Noted: 2017-11-17
